# Patient Record
Sex: MALE | Race: NATIVE HAWAIIAN OR OTHER PACIFIC ISLANDER | NOT HISPANIC OR LATINO | URBAN - METROPOLITAN AREA
[De-identification: names, ages, dates, MRNs, and addresses within clinical notes are randomized per-mention and may not be internally consistent; named-entity substitution may affect disease eponyms.]

---

## 2022-11-21 ENCOUNTER — EMERGENCY (EMERGENCY)
Facility: HOSPITAL | Age: 27
LOS: 1 days | Discharge: HOME | End: 2022-11-21
Attending: EMERGENCY MEDICINE | Admitting: EMERGENCY MEDICINE
Payer: COMMERCIAL

## 2022-11-21 VITALS
DIASTOLIC BLOOD PRESSURE: 79 MMHG | OXYGEN SATURATION: 98 % | WEIGHT: 160.06 LBS | SYSTOLIC BLOOD PRESSURE: 123 MMHG | RESPIRATION RATE: 18 BRPM | HEIGHT: 68 IN | HEART RATE: 70 BPM | TEMPERATURE: 98 F

## 2022-11-21 PROCEDURE — 99283 EMERGENCY DEPT VISIT LOW MDM: CPT

## 2022-11-21 RX ORDER — SODIUM CHLORIDE 9 MG/ML
1000 INJECTION INTRAMUSCULAR; INTRAVENOUS; SUBCUTANEOUS ONCE
Refills: 0 | Status: DISCONTINUED | OUTPATIENT
Start: 2022-11-21 | End: 2022-11-21

## 2022-11-21 RX ORDER — METOCLOPRAMIDE HCL 10 MG
10 TABLET ORAL ONCE
Refills: 0 | Status: DISCONTINUED | OUTPATIENT
Start: 2022-11-21 | End: 2022-11-21

## 2022-11-21 RX ORDER — KETOROLAC TROMETHAMINE 30 MG/ML
15 SYRINGE (ML) INJECTION ONCE
Refills: 0 | Status: DISCONTINUED | OUTPATIENT
Start: 2022-11-21 | End: 2022-11-21

## 2022-11-21 RX ORDER — ACETAMINOPHEN 500 MG
650 TABLET ORAL ONCE
Refills: 0 | Status: COMPLETED | OUTPATIENT
Start: 2022-11-21 | End: 2022-11-21

## 2022-11-21 RX ORDER — IBUPROFEN 200 MG
600 TABLET ORAL ONCE
Refills: 0 | Status: COMPLETED | OUTPATIENT
Start: 2022-11-21 | End: 2022-11-21

## 2022-11-21 RX ADMIN — Medication 600 MILLIGRAM(S): at 23:34

## 2022-11-21 RX ADMIN — Medication 600 MILLIGRAM(S): at 23:26

## 2022-11-21 RX ADMIN — Medication 650 MILLIGRAM(S): at 23:34

## 2022-11-21 RX ADMIN — Medication 650 MILLIGRAM(S): at 23:26

## 2022-11-21 NOTE — ED ADULT NURSE NOTE - OBJECTIVE STATEMENT
Patient states 1 week ago was doing Ju-jitsu when was placed in a choke hold longer than usual, felt some dizziness, did not pass out.  Noted the next day and for the past 6 days headache and head pain on top of head, sharp and throbbing, no nausea/vomitting/dizziness, states pain on movement and tender to palpation.  States only took Aspirin, no pain relief.  No blurring of vision, no neuro deficits.

## 2022-11-21 NOTE — ED ADULT NURSE NOTE - CHIEF COMPLAINT
Oriented - self; Oriented - place; Oriented - time
The patient is a 27y Male complaining of headache.

## 2022-11-21 NOTE — ED ADULT NURSE REASSESSMENT NOTE - NS ED NURSE REASSESS COMMENT FT1
Dr. Lopez made aware patient received Tylenol and MOtrin PO, states feeling better and does not want to wait for discharge instructions.  MD aware.

## 2022-11-21 NOTE — ED PROVIDER NOTE - PHYSICAL EXAMINATION
PERRL, EOMI, no nystagmus. CN intact. Strength 5/5. no temporal ttp. Steady unassisted gait. No pronator drift. Sensation intact. Normal speech, no dysarthria. No carotid bruits. Unable to visualize fundi.

## 2022-11-21 NOTE — ED PROVIDER NOTE - OBJECTIVE STATEMENT
27M no PMH p/w HA. Has ~6d of HA, located top of head and R temporal region, throbbing, intermittent. Pt states that the night before he was doing jiujitsu and was placed in chokehold and thinks that this may have triggered the symptoms. HA not worse in the morning. No other systemic symptoms.   Denies vision changes, voice changes, focal weakness/numbness, vertigo, neck pain, rashes, tinnitus, hearing changes, URI symptoms, f/c, SOB/CP, NVD, abd pain, urinary complaints, black/bloody stool, lifestyle/dietary/medication changes. 27M no PMH p/w HA. Has ~6d of HA, located top of head and R temporal region, throbbing, intermittent. Pt states that the night before he was doing jiujitsu and was placed in chokehold and thinks that this may have triggered the symptoms. HA not worse in the morning. No other systemic symptoms.   Denies vision changes, voice changes, focal weakness/numbness, vertigo, neck pain, rashes, tinnitus, hearing changes, URI symptoms, f/c, SOB/CP, NVD, abd pain, urinary complaints, black/bloody stool, lifestyle/dietary/medication changes.  In ED w/ mom.

## 2022-11-21 NOTE — ED ADULT NURSE NOTE - IN THE PAST 12 MONTHS HAVE YOU USED DRUGS OTHER THAN THOSE REQUIRED FOR MEDICAL REASON?
Abdominal pain and diarrhea x3 days Abdominal pain and diarrhea x3 days Abdominal pain and diarrhea x3 days No

## 2022-11-21 NOTE — ED PROVIDER NOTE - PATIENT PORTAL LINK FT
You can access the FollowMyHealth Patient Portal offered by [facility] by registering at the following website: [email or web address]. By joining [facility]’s FollowMyHealth portal, you will also be able to view your health information using other applications (apps) compatible with our system.

## 2022-11-21 NOTE — ED PROVIDER NOTE - CLINICAL SUMMARY MEDICAL DECISION MAKING FREE TEXT BOX
27M no PMH p/w HA. Has ~6d of HA, located top of head and R temporal region, throbbing. Pt states that the night before he was doing jiujitsu and was placed in chokehold and thinks that this may have triggered the symptoms. HA not worse in the morning. No other systemic symptoms.   Vitals wnl, exam as above.  ddx: Likely benign HA.   Symptom control, reassess. Likely outpt f/u.

## 2022-11-21 NOTE — ED PROVIDER NOTE - NSFOLLOWUPINSTRUCTIONS_ED_ALL_ED_FT
Can take tylenol 650mg every 6hrs as needed for pain.    Can also take motrin 600mg every 6hrs as needed for pain (WARNING: Use may cause stomach issues/problems. Take with food. Prolonged use can also cause kidney issues.).     Stay well hydrated.    Return for fevers, persistent vomit, uncontrolled pain, worsening breathing, worsening lightheaded, focal weakness/numbness, worsening vision changes.    Follow up with primary doctor within 1-2 days.     Follow up with neurologist. Can call [phone] to schedule appointment.     A headache is pain or discomfort felt around the head or neck area. The specific cause of a headache may not be found. There are many causes and types of headaches. A few common ones are:  Tension headaches.  Migraine headaches.  Cluster headaches.  Chronic daily headaches.  Follow these instructions at home:  Watch your condition for any changes.     Take these steps to help with your condition:  Managing pain   Take over-the-counter and prescription medicines only as told by your health care provider.  Lie down in a dark, quiet room when you have a headache.  If directed, apply ice to the head and neck area:  Put ice in a plastic bag.  Place a towel between your skin and the bag.  Leave the ice on for 20 minutes, 2–3 times per day.  Use a heating pad or hot shower to apply heat to the head and neck area as told by your health care provider.  Keep lights dim if bright lights bother you or make your headaches worse.    Eating and drinking   Eat meals on a regular schedule.  Limit alcohol use.  Decrease the amount of caffeine you drink, or stop drinking caffeine.    General instructions      Keep all follow-up visits as told by your health care provider. This is important.  Keep a headache journal to help find out what may trigger your headaches. For example, write down:  What you eat and drink.  How much sleep you get.  Any change to your diet or medicines.  Try massage or other relaxation techniques.  Limit stress.  Sit up straight, and do not tense your muscles.  Do not use tobacco products, including cigarettes, chewing tobacco, or e-cigarettes. If you need help quitting, ask your health care provider.  Exercise regularly as told by your health care provider.  Sleep on a regular schedule. Get 7–9 hours of sleep, or the amount recommended by your health care provider.    Contact a health care provider if:  Your symptoms are not helped by medicine.  You have a headache that is different from the usual headache.  You have nausea or you vomit.  You have a fever.  Get help right away if:  Your headache becomes severe.  You have repeated vomiting.  You have a stiff neck.  You have a loss of vision.  You have problems with speech.  You have pain in the eye or ear.  You have muscular weakness or loss of muscle control.  You lose your balance or have trouble walking.  You feel faint or pass out.  You have confusion.

## 2022-11-24 DIAGNOSIS — R51.9 HEADACHE, UNSPECIFIED: ICD-10-CM

## 2022-12-22 NOTE — ED PROVIDER NOTE - NEUROLOGICAL, MLM
I spoke to  Antonino Mccray in reference to colonoscopy screening. He said he will reach out to Dr. Emily Choi' office and schedule a colonoscopy.
Alert and oriented, no focal deficits, no motor or sensory deficits.

## 2024-02-16 NOTE — ED ADULT NURSE NOTE - PRO INTERPRETER NEED 2
Detail Level: Detailed Add 1585x Cpt? (Do Not Bill If You Billed For The Procedure Placing The Sutures. This Is An Add-On Code That Must Be Billed With An E/M Visit Code): No English